# Patient Record
Sex: FEMALE | Race: ASIAN | Employment: OTHER | URBAN - METROPOLITAN AREA
[De-identification: names, ages, dates, MRNs, and addresses within clinical notes are randomized per-mention and may not be internally consistent; named-entity substitution may affect disease eponyms.]

---

## 2017-12-12 ENCOUNTER — OFFICE VISIT (OUTPATIENT)
Dept: FAMILY MEDICINE CLINIC | Facility: CLINIC | Age: 72
End: 2017-12-12

## 2017-12-12 VITALS
RESPIRATION RATE: 16 BRPM | HEART RATE: 93 BPM | HEIGHT: 63.5 IN | BODY MASS INDEX: 19.45 KG/M2 | OXYGEN SATURATION: 97 % | DIASTOLIC BLOOD PRESSURE: 70 MMHG | TEMPERATURE: 98 F | WEIGHT: 111.13 LBS | SYSTOLIC BLOOD PRESSURE: 106 MMHG

## 2017-12-12 DIAGNOSIS — M85.80 OSTEOPENIA, UNSPECIFIED LOCATION: ICD-10-CM

## 2017-12-12 DIAGNOSIS — Z91.81 HISTORY OF FALL: ICD-10-CM

## 2017-12-12 DIAGNOSIS — Z00.00 ROUTINE PHYSICAL EXAMINATION: Primary | ICD-10-CM

## 2017-12-12 DIAGNOSIS — Z13.21 ENCOUNTER FOR VITAMIN DEFICIENCY SCREENING: ICD-10-CM

## 2017-12-12 DIAGNOSIS — Z23 NEED FOR INFLUENZA VACCINATION: ICD-10-CM

## 2017-12-12 DIAGNOSIS — Z00.00 LABORATORY EXAMINATION ORDERED AS PART OF A COMPLETE PHYSICAL EXAMINATION: ICD-10-CM

## 2017-12-12 PROCEDURE — 99397 PER PM REEVAL EST PAT 65+ YR: CPT | Performed by: FAMILY MEDICINE

## 2017-12-12 PROCEDURE — 90471 IMMUNIZATION ADMIN: CPT | Performed by: FAMILY MEDICINE

## 2017-12-12 PROCEDURE — 90653 IIV ADJUVANT VACCINE IM: CPT | Performed by: FAMILY MEDICINE

## 2017-12-12 NOTE — PROGRESS NOTES
Maricel Martinez is a 81year old female. HPI:  Pt is here for routine physical  Working about 30-35 hours/week at [employer] where she has been employed for many years.  Very active with her job  Overall doing well  Needs work form completed  Appe 19.38 kg/m²   Wt Readings from Last 6 Encounters:  12/12/17 : 111 lb 2 oz  10/28/16 : 113 lb  02/02/15 : 114 lb    GENERAL: well developed, well nourished,in no apparent distress, pleasant affect  SKIN: no rashes,no suspicious lesions  HEENT: atraumatic, n DEXA scan, has h/o osteopenia and was on Fosamax in past. Does wt bearing activities w/ work and is very active. No joint sxs  Reviewed Vitamin D supplementation daily. - XR DEXA BONE DENSITOMETRY (CPT=77080); Future    5.  History of fall  Advised on safe

## 2019-10-01 PROCEDURE — 86480 TB TEST CELL IMMUN MEASURE: CPT | Performed by: INTERNAL MEDICINE

## 2021-01-06 PROBLEM — F32.9 REACTIVE DEPRESSION: Status: ACTIVE | Noted: 2021-01-06

## 2021-01-12 PROBLEM — R73.01 ELEVATED FASTING BLOOD SUGAR: Status: ACTIVE | Noted: 2021-01-12

## 2021-01-12 PROBLEM — Z91.89 AT INCREASED RISK FOR CARDIOVASCULAR DISEASE: Status: ACTIVE | Noted: 2021-01-12

## 2021-03-15 DIAGNOSIS — Z23 NEED FOR VACCINATION: ICD-10-CM

## 2025-02-03 ENCOUNTER — HOSPITAL ENCOUNTER (EMERGENCY)
Facility: HOSPITAL | Age: 80
Discharge: HOME | End: 2025-02-03
Attending: EMERGENCY MEDICINE
Payer: MEDICARE

## 2025-02-03 ENCOUNTER — APPOINTMENT (OUTPATIENT)
Dept: CT IMAGING | Facility: HOSPITAL | Age: 80
End: 2025-02-03
Attending: EMERGENCY MEDICINE
Payer: MEDICARE

## 2025-02-03 VITALS
BODY MASS INDEX: 18 KG/M2 | DIASTOLIC BLOOD PRESSURE: 81 MMHG | WEIGHT: 99.19 LBS | OXYGEN SATURATION: 98 % | SYSTOLIC BLOOD PRESSURE: 182 MMHG | TEMPERATURE: 98 F | RESPIRATION RATE: 20 BRPM | HEART RATE: 69 BPM

## 2025-02-03 DIAGNOSIS — I10 HYPERTENSION, UNSPECIFIED TYPE: ICD-10-CM

## 2025-02-03 DIAGNOSIS — R55 EPISODE OF LOSS OF CONSCIOUSNESS: Primary | ICD-10-CM

## 2025-02-03 LAB
ALBUMIN SERPL-MCNC: 4.4 G/DL (ref 3.2–4.8)
ALBUMIN/GLOB SERPL: 1.6 {RATIO} (ref 1–2)
ALP LIVER SERPL-CCNC: 86 U/L
ALT SERPL-CCNC: 25 U/L
ANION GAP SERPL CALC-SCNC: 7 MMOL/L (ref 0–18)
AST SERPL-CCNC: 26 U/L (ref ?–34)
ATRIAL RATE: 66 BPM
BASOPHILS # BLD AUTO: 0.06 X10(3) UL (ref 0–0.2)
BASOPHILS NFR BLD AUTO: 1.1 %
BILIRUB SERPL-MCNC: 0.6 MG/DL (ref 0.2–1.1)
BUN BLD-MCNC: 14 MG/DL (ref 9–23)
CALCIUM BLD-MCNC: 9.5 MG/DL (ref 8.7–10.6)
CHLORIDE SERPL-SCNC: 108 MMOL/L (ref 98–112)
CO2 SERPL-SCNC: 26 MMOL/L (ref 21–32)
CREAT BLD-MCNC: 0.88 MG/DL
EGFRCR SERPLBLD CKD-EPI 2021: 68 ML/MIN/1.73M2 (ref 60–?)
EOSINOPHIL # BLD AUTO: 0.24 X10(3) UL (ref 0–0.7)
EOSINOPHIL NFR BLD AUTO: 4.3 %
ERYTHROCYTE [DISTWIDTH] IN BLOOD BY AUTOMATED COUNT: 13.6 %
GLOBULIN PLAS-MCNC: 2.7 G/DL (ref 2–3.5)
GLUCOSE BLD-MCNC: 112 MG/DL (ref 70–99)
HCT VFR BLD AUTO: 39.5 %
HGB BLD-MCNC: 13.4 G/DL
IMM GRANULOCYTES # BLD AUTO: 0.03 X10(3) UL (ref 0–1)
IMM GRANULOCYTES NFR BLD: 0.5 %
LYMPHOCYTES # BLD AUTO: 1.34 X10(3) UL (ref 1–4)
LYMPHOCYTES NFR BLD AUTO: 23.9 %
MCH RBC QN AUTO: 30.7 PG (ref 26–34)
MCHC RBC AUTO-ENTMCNC: 33.9 G/DL (ref 31–37)
MCV RBC AUTO: 90.6 FL
MONOCYTES # BLD AUTO: 0.56 X10(3) UL (ref 0.1–1)
MONOCYTES NFR BLD AUTO: 10 %
NEUTROPHILS # BLD AUTO: 3.38 X10 (3) UL (ref 1.5–7.7)
NEUTROPHILS # BLD AUTO: 3.38 X10(3) UL (ref 1.5–7.7)
NEUTROPHILS NFR BLD AUTO: 60.2 %
OSMOLALITY SERPL CALC.SUM OF ELEC: 293 MOSM/KG (ref 275–295)
P AXIS: 81 DEGREES
P-R INTERVAL: 160 MS
PLATELET # BLD AUTO: 198 10(3)UL (ref 150–450)
POTASSIUM SERPL-SCNC: 4.3 MMOL/L (ref 3.5–5.1)
PROT SERPL-MCNC: 7.1 G/DL (ref 5.7–8.2)
Q-T INTERVAL: 372 MS
QRS DURATION: 72 MS
QTC CALCULATION (BEZET): 389 MS
R AXIS: 39 DEGREES
RBC # BLD AUTO: 4.36 X10(6)UL
SODIUM SERPL-SCNC: 141 MMOL/L (ref 136–145)
T AXIS: 63 DEGREES
VENTRICULAR RATE: 66 BPM
WBC # BLD AUTO: 5.6 X10(3) UL (ref 4–11)

## 2025-02-03 PROCEDURE — 99284 EMERGENCY DEPT VISIT MOD MDM: CPT

## 2025-02-03 PROCEDURE — 99285 EMERGENCY DEPT VISIT HI MDM: CPT

## 2025-02-03 PROCEDURE — 80053 COMPREHEN METABOLIC PANEL: CPT | Performed by: EMERGENCY MEDICINE

## 2025-02-03 PROCEDURE — 93010 ELECTROCARDIOGRAM REPORT: CPT

## 2025-02-03 PROCEDURE — 70450 CT HEAD/BRAIN W/O DYE: CPT | Performed by: EMERGENCY MEDICINE

## 2025-02-03 PROCEDURE — 85025 COMPLETE CBC W/AUTO DIFF WBC: CPT | Performed by: EMERGENCY MEDICINE

## 2025-02-03 PROCEDURE — 93005 ELECTROCARDIOGRAM TRACING: CPT

## 2025-02-03 PROCEDURE — 36415 COLL VENOUS BLD VENIPUNCTURE: CPT

## 2025-02-03 RX ORDER — LISINOPRIL 10 MG/1
10 TABLET ORAL DAILY
Qty: 30 TABLET | Refills: 0 | Status: SHIPPED | OUTPATIENT
Start: 2025-02-03 | End: 2025-03-05

## 2025-02-03 NOTE — ED QUICK NOTES
Rounding Completed    Plan of Care reviewed. Waiting for results.      Bed is locked and in lowest position. Call light within reach. Family at bedside.

## 2025-02-03 NOTE — ED INITIAL ASSESSMENT (HPI)
PT's family state that the PT has a near syncope episode two days ago while visiting [place]. PT's relatives states that the Pt \"rolled her eyes back, was unresponsive for a few minutes and then she appeared to be very anxious and was hypertensive\" after the incident. PT visited a cardiologist in [place] after the event, family states all blood work and EKG normal, PT given prescription for Captopril ( for blood pressure) and was given antianxiety medication during the visit. PT adds that the PT sustained a fall with laceration to the head three weeks ago.  # [id number] (Ukrainian) used during assessment. PT concerned about anxiety and also wants to follow up regarding the near syncope episode

## 2025-02-03 NOTE — ED PROVIDER NOTES
Patient Seen in: [facility] Emergency Department      History     Chief Complaint   Patient presents with    Anxiety/Panic attack    Syncope     Stated Complaint: Came from [place]- c/o increased anxiety; had syncope sx on  while in [facility]*    Subjective:   HPI      Patient with family.  They are here for further evaluation/reevaluation of an incident occurred about a week ago while they were in [facility].    Patient had some sort of episode where she fell, was unresponsive, shaking, her eyes were rolling back.  When she came out of it and her blood pressure was elevated.  They took her to her cardiology clinic where she had a reassuring EKG and normal blood work and troponin.  They prescribed her captopril for her blood pressure.    Family states he checked her blood pressure a couple times since then and it has been acceptable.    She flew back today.  She denies any chest pain, shortness of breath, palpitations.  No swelling or legs.  No syncope or near syncope.  They here today because they recalled that several weeks prior she fell and struck her head and they wanted make sure that there was not a head injury.  Additionally  They wanted make sure that no other testing to be done urgently.    The patient herself has a history of dementia and is not able to recall any events.  She presently denies any symptoms at all.    Objective:     Past Medical History:    Anxiety    Dementia (HCC)    Depression    Leukocytopenia, unspecified    Nephrolithiasis    Osteopenia    Psoriasis              Past Surgical History:   Procedure Laterality Date    Benign biopsy left      date unknown benign per pt.     Breast biopsy      Benign    D & c            x 3     Other surgical history      D&C x2    Other surgical history      L breast mass excision  benign                Social History     Socioeconomic History    Marital status:    Occupational History    Occupation: childcare    Tobacco Use    Smoking  status: Never    Smokeless tobacco: Never    Tobacco comments:     No tobacco use   Substance and Sexual Activity    Alcohol use: No    Drug use: No   Social History Narrative    Lives with her son      Social Drivers of Health      Received from [facility]    Social Connections    Received from [facility]    Housing Stability                  Physical Exam     ED Triage Vitals [02/03/25 0801]   /72   Pulse 74   Resp 16   Temp 98.2 °F (36.8 °C)   Temp src Oral   SpO2 96 %   O2 Device None (Room air)       Current Vitals:   Vital Signs  BP: (!) 182/81  Pulse: 69  Resp: 20  Temp: 98.2 °F (36.8 °C)  Temp src: Oral  MAP (mmHg): (!) 115    Oxygen Therapy  SpO2: 98 %  O2 Device: None (Room air)        Physical Exam    Constitutional: Awake, alert, age appearing, non-toxic  Head: Normocephalic and atraumatic.     Eyes: EOM are normal. Pupils are equal, round, and reactive to light.   Neck: Normal range of motion. Neck supple. No JVD present.     Cardiovascular: Normal rate and regular rhythm.  Normal peripheral perfusion with good color.  Pulmonary/Chest: Normal effort.  No accessory muscle use.  No clubbing, no cyanosis.  Abdominal: Soft. There is no tenderness. There is no guarding.     Musculoskeletal: No swelling, deformity or ecchymosis.    Neurological: Pt is alert and oriented to person, place, and time. No cranial nerve deficit.     Steady gait  No numbness weakness any extremity      Skin: Skin is warm and dry.   Psychiatric: Normal mood and affect. Thought content normal.       ED Course     Labs Reviewed   COMP METABOLIC PANEL (14) - Abnormal; Notable for the following components:       Result Value    Glucose 112 (*)     All other components within normal limits   CBC WITH DIFFERENTIAL WITH PLATELET   RAINBOW DRAW LAVENDER   RAINBOW DRAW LIGHT GREEN   RAINBOW DRAW BLUE     EKG    Rate, intervals and axes as noted on EKG Report.  Rate: 66  Rhythm: Sinus  Rhythm  Reading: Sinus rhythm no acute ischemia                Blood work is reassuring    CT BRAIN OR HEAD (CPT=70450)    Result Date: 2/3/2025  CONCLUSION:  No intracranial abnormality.  Continued clinical correlation recommended.    LOCATION:  [facility]   Dictated by (CST): Eder Duke MD on 2/03/2025 at 11:01 AM     Finalized by (CST): Eder Duke MD on 2/03/2025 at 11:04 AM             MDM              Differential diagnoses considered: Syncope, seizure, anxiety attack as positive by family, delayed life-threatening intracranial hemorrhage all considered.    -Labs EKG here reassuring.  -Will obtain CT brain to assure that there is no delayed bleed  -If negative can do outpatient follow-up with PCP.  Etiology of the events last week  abroad still unclear.  I would consider outpatient cardiac eval, perhaps echo and Holter.      1140  I discussed results with family.  Okay with discharge home.  I will give her supply lisinopril to replace the captopril that she is been taking.  They may take it once daily or if they like, daily as needed for BP is greater than 160/90.  Advise close outpatient follow-up PCP.      I visualized the radiology studies, my independent interpretation: No large intracranial hemorrhage noted on CT scan of brain      *Additional sources of history:  family bedside assisted in history patient has a history of dementia and I will provide meaningful history.    Shared decision making was done by: patient, myself.          Medical Decision Making      Disposition and Plan     Clinical Impression:  1. Episode of loss of consciousness    2. Hypertension, unspecified type         Disposition:  Discharge  2/3/2025 11:22 am    Follow-up:  Shahla Mijares MD  [address] [place] [id number]  [phone]    Follow up            Medications Prescribed:  Discharge Medication List as of 2/3/2025 11:35 AM              Supplementary Documentation:

## 2025-02-03 NOTE — DISCHARGE INSTRUCTIONS
1 tablet of lisinopril.  You may do this once a day as needed.  Be sure to follow-up with your primary care doctor.